# Patient Record
Sex: MALE | Race: WHITE | Employment: OTHER
[De-identification: names, ages, dates, MRNs, and addresses within clinical notes are randomized per-mention and may not be internally consistent; named-entity substitution may affect disease eponyms.]

---

## 2017-02-14 ENCOUNTER — SURGERY (OUTPATIENT)
Age: 72
End: 2017-02-14

## 2017-02-14 ENCOUNTER — HOSPITAL ENCOUNTER (OUTPATIENT)
Facility: CLINIC | Age: 72
Discharge: HOME | End: 2017-02-14
Attending: OPHTHALMOLOGY | Admitting: OPHTHALMOLOGY
Payer: COMMERCIAL

## 2017-02-14 VITALS
RESPIRATION RATE: 16 BRPM | HEIGHT: 70 IN | BODY MASS INDEX: 27.2 KG/M2 | WEIGHT: 190 LBS | DIASTOLIC BLOOD PRESSURE: 73 MMHG | SYSTOLIC BLOOD PRESSURE: 145 MMHG | TEMPERATURE: 96.8 F | OXYGEN SATURATION: 96 %

## 2017-02-14 PROCEDURE — 25000125 ZZHC RX 250: Performed by: OPHTHALMOLOGY

## 2017-02-14 PROCEDURE — 25000132 ZZH RX MED GY IP 250 OP 250 PS 637: Performed by: OPHTHALMOLOGY

## 2017-02-14 PROCEDURE — 66821 AFTER CATARACT LASER SURGERY: CPT | Performed by: OPHTHALMOLOGY

## 2017-02-14 RX ORDER — TROPICAMIDE 10 MG/ML
1 SOLUTION/ DROPS OPHTHALMIC ONCE
Status: COMPLETED | OUTPATIENT
Start: 2017-02-14 | End: 2017-02-14

## 2017-02-14 RX ORDER — PHENYLEPHRINE HYDROCHLORIDE 25 MG/ML
1 SOLUTION/ DROPS OPHTHALMIC ONCE
Status: COMPLETED | OUTPATIENT
Start: 2017-02-14 | End: 2017-02-14

## 2017-02-14 RX ADMIN — TROPICAMIDE 1 DROP: 10 SOLUTION/ DROPS OPHTHALMIC at 12:42

## 2017-02-14 RX ADMIN — PHENYLEPHRINE HYDROCHLORIDE 1 DROP: 2.5 SOLUTION/ DROPS OPHTHALMIC at 12:42

## 2017-02-14 RX ADMIN — APRACLONIDINE HYDROCHLORIDE 1 DROP: 10 SOLUTION/ DROPS OPHTHALMIC at 12:43

## 2017-02-14 NOTE — OP NOTE
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      Comments: Pt. to follow up at office in 48 hours.

## 2017-02-14 NOTE — BRIEF OP NOTE
[facility] Brief Operative Note    Pre-operative diagnosis: RIGHT EYE SECONDARY MEMBRANE   Post-operative diagnosis posterior capsular opacification, right eye     Procedure: Procedure(s):  RIGHT EYE YAG LASER CAPSULOTOMY - Wound Class: I-Clean   Surgeon(s): Surgeon(s) and Role:     * Rosana Mcduffie MD - Primary   Estimated blood loss: * No values recorded between 2/14/2017 12:00 AM and 2/14/2017  1:28 PM *    Specimens: * No specimens in log *   Findings: